# Patient Record
Sex: MALE
[De-identification: names, ages, dates, MRNs, and addresses within clinical notes are randomized per-mention and may not be internally consistent; named-entity substitution may affect disease eponyms.]

---

## 2022-03-25 PROBLEM — Z00.00 ENCOUNTER FOR PREVENTIVE HEALTH EXAMINATION: Status: ACTIVE | Noted: 2022-03-25

## 2022-03-28 ENCOUNTER — APPOINTMENT (OUTPATIENT)
Dept: OTOLARYNGOLOGY | Facility: CLINIC | Age: 47
End: 2022-03-28
Payer: COMMERCIAL

## 2022-03-28 VITALS — TEMPERATURE: 95.4 F | HEIGHT: 75 IN | WEIGHT: 265 LBS | BODY MASS INDEX: 32.95 KG/M2

## 2022-03-28 DIAGNOSIS — H92.02 OTALGIA, LEFT EAR: ICD-10-CM

## 2022-03-28 DIAGNOSIS — J31.0 CHRONIC RHINITIS: ICD-10-CM

## 2022-03-28 DIAGNOSIS — Z87.39 PERSONAL HISTORY OF OTHER DISEASES OF THE MUSCULOSKELETAL SYSTEM AND CONNECTIVE TISSUE: ICD-10-CM

## 2022-03-28 DIAGNOSIS — T48.5X5A CHRONIC RHINITIS: ICD-10-CM

## 2022-03-28 DIAGNOSIS — Z78.9 OTHER SPECIFIED HEALTH STATUS: ICD-10-CM

## 2022-03-28 PROCEDURE — 99204 OFFICE O/P NEW MOD 45 MIN: CPT | Mod: 25

## 2022-03-28 PROCEDURE — 31231 NASAL ENDOSCOPY DX: CPT

## 2022-03-28 RX ORDER — PREDNISONE 10 MG/1
10 TABLET ORAL
Qty: 30 | Refills: 11 | Status: ACTIVE | COMMUNITY
Start: 2022-03-28 | End: 1900-01-01

## 2022-03-28 RX ORDER — FLUTICASONE PROPIONATE 50 UG/1
50 SPRAY, METERED NASAL
Qty: 1 | Refills: 1 | Status: ACTIVE | COMMUNITY
Start: 2022-03-28 | End: 1900-01-01

## 2022-03-28 RX ORDER — LORATADINE 5 MG/5 ML
SOLUTION, ORAL ORAL
Refills: 0 | Status: ACTIVE | COMMUNITY

## 2022-03-28 NOTE — HISTORY OF PRESENT ILLNESS
[de-identified] : Initial visit.\par He has approximately 3 to 4-month history of bilateral nasal congestion.\par He reports that this is associated with facial pain and numbness on the left.\par He reports that he goes through a "bottle of Afrin a day".\par He reports a normal sense of smell and taste.\par \par He also is reporting left ear pain.\par He recently saw his dentist who diagnosed him with bruxism is recommending a bite block.

## 2022-03-28 NOTE — ASSESSMENT
[FreeTextEntry1] : He will be treated with a high dose burst of steroid.\par I will also prescribe Flonase.\par He will discontinue using Afrin in the next 48 hours.\par \par His ear pain is related to TMJ D, patient education material was provided.\par \par I will see him in follow-up in 3 weeks.
